# Patient Record
Sex: MALE | Race: BLACK OR AFRICAN AMERICAN | NOT HISPANIC OR LATINO | URBAN - METROPOLITAN AREA
[De-identification: names, ages, dates, MRNs, and addresses within clinical notes are randomized per-mention and may not be internally consistent; named-entity substitution may affect disease eponyms.]

---

## 2024-07-16 ENCOUNTER — OUTPATIENT (OUTPATIENT)
Dept: OUTPATIENT SERVICES | Facility: HOSPITAL | Age: 49
LOS: 1 days | End: 2024-07-16
Payer: MEDICAID

## 2024-07-16 VITALS
RESPIRATION RATE: 18 BRPM | TEMPERATURE: 98 F | HEART RATE: 59 BPM | SYSTOLIC BLOOD PRESSURE: 112 MMHG | WEIGHT: 209 LBS | DIASTOLIC BLOOD PRESSURE: 79 MMHG | OXYGEN SATURATION: 98 % | HEIGHT: 70 IN

## 2024-07-16 DIAGNOSIS — I10 ESSENTIAL (PRIMARY) HYPERTENSION: ICD-10-CM

## 2024-07-16 DIAGNOSIS — Z90.49 ACQUIRED ABSENCE OF OTHER SPECIFIED PARTS OF DIGESTIVE TRACT: Chronic | ICD-10-CM

## 2024-07-16 DIAGNOSIS — Z01.818 ENCOUNTER FOR OTHER PREPROCEDURAL EXAMINATION: ICD-10-CM

## 2024-07-16 DIAGNOSIS — L30.9 DERMATITIS, UNSPECIFIED: ICD-10-CM

## 2024-07-16 DIAGNOSIS — K40.90 UNILATERAL INGUINAL HERNIA, WITHOUT OBSTRUCTION OR GANGRENE, NOT SPECIFIED AS RECURRENT: ICD-10-CM

## 2024-07-16 LAB
ALBUMIN SERPL ELPH-MCNC: 3.6 G/DL — SIGNIFICANT CHANGE UP (ref 3.5–5)
ALP SERPL-CCNC: 66 U/L — SIGNIFICANT CHANGE UP (ref 40–120)
ALT FLD-CCNC: 31 U/L DA — SIGNIFICANT CHANGE UP (ref 10–60)
ANION GAP SERPL CALC-SCNC: 4 MMOL/L — LOW (ref 5–17)
APPEARANCE UR: CLEAR — SIGNIFICANT CHANGE UP
APTT BLD: 32 SEC — SIGNIFICANT CHANGE UP (ref 24.5–35.6)
AST SERPL-CCNC: 21 U/L — SIGNIFICANT CHANGE UP (ref 10–40)
BACTERIA # UR AUTO: ABNORMAL /HPF
BILIRUB SERPL-MCNC: 0.6 MG/DL — SIGNIFICANT CHANGE UP (ref 0.2–1.2)
BILIRUB UR-MCNC: NEGATIVE — SIGNIFICANT CHANGE UP
BUN SERPL-MCNC: 14 MG/DL — SIGNIFICANT CHANGE UP (ref 7–18)
CALCIUM SERPL-MCNC: 10.2 MG/DL — SIGNIFICANT CHANGE UP (ref 8.4–10.5)
CHLORIDE SERPL-SCNC: 110 MMOL/L — HIGH (ref 96–108)
CO2 SERPL-SCNC: 26 MMOL/L — SIGNIFICANT CHANGE UP (ref 22–31)
COLOR SPEC: YELLOW — SIGNIFICANT CHANGE UP
CREAT SERPL-MCNC: 1.09 MG/DL — SIGNIFICANT CHANGE UP (ref 0.5–1.3)
DIFF PNL FLD: NEGATIVE — SIGNIFICANT CHANGE UP
EGFR: 82 ML/MIN/1.73M2 — SIGNIFICANT CHANGE UP
GLUCOSE SERPL-MCNC: 74 MG/DL — SIGNIFICANT CHANGE UP (ref 70–99)
GLUCOSE UR QL: NEGATIVE MG/DL — SIGNIFICANT CHANGE UP
HCT VFR BLD CALC: 41.4 % — SIGNIFICANT CHANGE UP (ref 39–50)
HGB BLD-MCNC: 13.1 G/DL — SIGNIFICANT CHANGE UP (ref 13–17)
INR BLD: 0.89 RATIO — SIGNIFICANT CHANGE UP (ref 0.85–1.18)
KETONES UR-MCNC: ABNORMAL MG/DL
LEUKOCYTE ESTERASE UR-ACNC: NEGATIVE — SIGNIFICANT CHANGE UP
MCHC RBC-ENTMCNC: 25.8 PG — LOW (ref 27–34)
MCHC RBC-ENTMCNC: 31.6 GM/DL — LOW (ref 32–36)
MCV RBC AUTO: 81.7 FL — SIGNIFICANT CHANGE UP (ref 80–100)
NITRITE UR-MCNC: NEGATIVE — SIGNIFICANT CHANGE UP
NRBC # BLD: 0 /100 WBCS — SIGNIFICANT CHANGE UP (ref 0–0)
PH UR: 6.5 — SIGNIFICANT CHANGE UP (ref 5–8)
PLATELET # BLD AUTO: 170 K/UL — SIGNIFICANT CHANGE UP (ref 150–400)
POTASSIUM SERPL-MCNC: 4.2 MMOL/L — SIGNIFICANT CHANGE UP (ref 3.5–5.3)
POTASSIUM SERPL-SCNC: 4.2 MMOL/L — SIGNIFICANT CHANGE UP (ref 3.5–5.3)
PROT SERPL-MCNC: 7.2 G/DL — SIGNIFICANT CHANGE UP (ref 6–8.3)
PROT UR-MCNC: NEGATIVE MG/DL — SIGNIFICANT CHANGE UP
PROTHROM AB SERPL-ACNC: 10.2 SEC — SIGNIFICANT CHANGE UP (ref 9.5–13)
RBC # BLD: 5.07 M/UL — SIGNIFICANT CHANGE UP (ref 4.2–5.8)
RBC # FLD: 14.9 % — HIGH (ref 10.3–14.5)
RBC CASTS # UR COMP ASSIST: 1 /HPF — SIGNIFICANT CHANGE UP (ref 0–4)
SODIUM SERPL-SCNC: 140 MMOL/L — SIGNIFICANT CHANGE UP (ref 135–145)
SP GR SPEC: 1.02 — SIGNIFICANT CHANGE UP (ref 1–1.03)
UROBILINOGEN FLD QL: 0.2 MG/DL — SIGNIFICANT CHANGE UP (ref 0.2–1)
WBC # BLD: 4.19 K/UL — SIGNIFICANT CHANGE UP (ref 3.8–10.5)
WBC # FLD AUTO: 4.19 K/UL — SIGNIFICANT CHANGE UP (ref 3.8–10.5)
WBC UR QL: 0 /HPF — SIGNIFICANT CHANGE UP (ref 0–5)

## 2024-07-16 NOTE — H&P PST ADULT - PROBLEM SELECTOR PLAN 1
Pt is scheduled for right inguinal hernia repair with mesh on 7/31/24.  KALA stop bang score 3. Pt denies history of KALA, never did sleep study.  Labs and EKG done in PST-results will be faxed to PCP for medical optimization.  Preoperative instructions discussed with pt and copy of instructions given to pt. Instructed pt that he will need someone to escort him home after surgery, to notify security when he arrives in the lobby of the hospital that he is here for surgery, not to eat or drink anything after midnight the night before the surgery, to avoid NSAIDs such as Ibuprofen, Motrin, aleve, Advil, naproxen before surgery, to take Tylenol if needed for pain, to report if he has been exposed to any one with any contagious diseases including Covid-19 or if she is exhibiting any symptoms of COVID-19. Instructed about use of Chlorhexidine 4% soap for 3 days before surgery including the morning of surgery. Verbalized understanding of instructions given.

## 2024-07-16 NOTE — H&P PST ADULT - NSICDXPASTMEDICALHX_GEN_ALL_CORE_FT
PAST MEDICAL HISTORY:  HTN (hypertension)     Unilateral inguinal hernia without obstruction or gangrene

## 2024-07-16 NOTE — H&P PST ADULT - ASSESSMENT
This is a 49 year old male with PMH of Hypertension on Atenolol who presents with inguinal hernia without obstruction or gangrene. Pt is scheduled for right inguinal hernia repair with mesh on 7/31/24.  KALA stop bang score 3. Pt denies history of KALA, never did sleep study.

## 2024-07-16 NOTE — H&P PST ADULT - PROBLEM SELECTOR PLAN 2
Instructed to continue antihypertensive medication-Atenolol and take it with sips of water on day of surgery. Follow-up with PCP for management.

## 2024-07-16 NOTE — H&P PST ADULT - NSICDXFAMILYHX_GEN_ALL_CORE_FT
FAMILY HISTORY:  Mother  Still living? Yes, Estimated age: Age Unknown  Family history of CHF (congestive heart failure), Age at diagnosis: Age Unknown  Family history of COPD (chronic obstructive pulmonary disease), Age at diagnosis: Age Unknown  Family history of hypertension, Age at diagnosis: Age Unknown

## 2024-07-16 NOTE — H&P PST ADULT - HISTORY OF PRESENT ILLNESS
This is a 49 year old male with PMH of Hypertension on Atenolol who presents with c/o intermittent inguinal pain due to hernia. Pt reports worsening of pain with activity. Pt for right inguinal hernia repair with mesh on 7/31/24. This is a 49 year old male with PMH of Hypertension on Atenolol who presents with c/o intermittent right inguinal pain due to hernia. Pt is scheduled for right inguinal hernia repair with mesh on 7/31/24.

## 2024-07-17 LAB
HCV AB S/CO SERPL IA: 0.42 S/CO — SIGNIFICANT CHANGE UP (ref 0–0.99)
HCV AB SERPL-IMP: SIGNIFICANT CHANGE UP

## 2024-07-17 PROCEDURE — 93005 ELECTROCARDIOGRAM TRACING: CPT

## 2024-07-17 PROCEDURE — 85730 THROMBOPLASTIN TIME PARTIAL: CPT

## 2024-07-17 PROCEDURE — 86803 HEPATITIS C AB TEST: CPT

## 2024-07-17 PROCEDURE — 85610 PROTHROMBIN TIME: CPT

## 2024-07-17 PROCEDURE — 85027 COMPLETE CBC AUTOMATED: CPT

## 2024-07-17 PROCEDURE — 80053 COMPREHEN METABOLIC PANEL: CPT

## 2024-07-17 PROCEDURE — G0463: CPT

## 2024-07-17 PROCEDURE — 81001 URINALYSIS AUTO W/SCOPE: CPT

## 2024-07-17 PROCEDURE — 36415 COLL VENOUS BLD VENIPUNCTURE: CPT

## 2024-07-30 ENCOUNTER — TRANSCRIPTION ENCOUNTER (OUTPATIENT)
Age: 49
End: 2024-07-30

## 2024-07-31 ENCOUNTER — OUTPATIENT (OUTPATIENT)
Dept: OUTPATIENT SERVICES | Facility: HOSPITAL | Age: 49
LOS: 1 days | End: 2024-07-31
Payer: MEDICAID

## 2024-07-31 ENCOUNTER — TRANSCRIPTION ENCOUNTER (OUTPATIENT)
Age: 49
End: 2024-07-31

## 2024-07-31 VITALS
RESPIRATION RATE: 14 BRPM | DIASTOLIC BLOOD PRESSURE: 77 MMHG | SYSTOLIC BLOOD PRESSURE: 137 MMHG | OXYGEN SATURATION: 100 % | TEMPERATURE: 98 F

## 2024-07-31 VITALS
TEMPERATURE: 98 F | OXYGEN SATURATION: 99 % | HEART RATE: 55 BPM | DIASTOLIC BLOOD PRESSURE: 74 MMHG | WEIGHT: 209 LBS | HEIGHT: 70 IN | RESPIRATION RATE: 16 BRPM | SYSTOLIC BLOOD PRESSURE: 108 MMHG

## 2024-07-31 DIAGNOSIS — K40.90 UNILATERAL INGUINAL HERNIA, WITHOUT OBSTRUCTION OR GANGRENE, NOT SPECIFIED AS RECURRENT: ICD-10-CM

## 2024-07-31 DIAGNOSIS — Z90.49 ACQUIRED ABSENCE OF OTHER SPECIFIED PARTS OF DIGESTIVE TRACT: Chronic | ICD-10-CM

## 2024-07-31 PROCEDURE — C1781: CPT

## 2024-07-31 PROCEDURE — 49525 REPAIR ING HERNIA SLIDING: CPT | Mod: RT

## 2024-07-31 DEVICE — MESH HERNIA PERFIX PLUG LARGE 1.6 X 1.90": Type: IMPLANTABLE DEVICE | Site: RIGHT | Status: FUNCTIONAL

## 2024-07-31 RX ORDER — OXYCODONE AND ACETAMINOPHEN 10; 325 MG/1; MG/1
1 TABLET ORAL
Qty: 12 | Refills: 0
Start: 2024-07-31 | End: 2024-08-02

## 2024-07-31 RX ORDER — BACTERIOSTATIC SODIUM CHLORIDE 0.9 %
3 VIAL (ML) INJECTION EVERY 8 HOURS
Refills: 0 | Status: DISCONTINUED | OUTPATIENT
Start: 2024-07-31 | End: 2024-07-31

## 2024-07-31 RX ORDER — DOCUSATE SODIUM 50 MG/5ML
1 LIQUID ORAL
Qty: 14 | Refills: 0
Start: 2024-07-31 | End: 2024-08-06

## 2024-07-31 RX ORDER — BETAMETHASONE DIPROPIONATE 0.5 MG/G
1 CREAM TOPICAL
Refills: 0 | DISCHARGE

## 2024-07-31 RX ORDER — FENTANYL CITRATE 1200 UG/1
25 LOZENGE ORAL; TRANSMUCOSAL
Refills: 0 | Status: DISCONTINUED | OUTPATIENT
Start: 2024-07-31 | End: 2024-07-31

## 2024-07-31 RX ORDER — ATENOLOL 50 MG/1
1 TABLET ORAL
Refills: 0 | DISCHARGE

## 2024-07-31 RX ORDER — OXYCODONE HYDROCHLORIDE 30 MG/1
5 TABLET ORAL ONCE
Refills: 0 | Status: DISCONTINUED | OUTPATIENT
Start: 2024-07-31 | End: 2024-07-31

## 2024-07-31 RX ORDER — SENNOSIDES 8.6 MG/1
1 TABLET ORAL
Qty: 7 | Refills: 0
Start: 2024-07-31 | End: 2024-08-06

## 2024-07-31 RX ORDER — DEXTROSE MONOHYDRATE, SODIUM CHLORIDE, SODIUM LACTATE, CALCIUM CHLORIDE, MAGNESIUM CHLORIDE 1.5; 538; 448; 18.4; 5.08 G/100ML; MG/100ML; MG/100ML; MG/100ML; MG/100ML
1000 SOLUTION INTRAPERITONEAL
Refills: 0 | Status: DISCONTINUED | OUTPATIENT
Start: 2024-07-31 | End: 2024-07-31

## 2024-07-31 RX ORDER — FENTANYL CITRATE 1200 UG/1
50 LOZENGE ORAL; TRANSMUCOSAL
Refills: 0 | Status: DISCONTINUED | OUTPATIENT
Start: 2024-07-31 | End: 2024-07-31

## 2024-07-31 RX ADMIN — FENTANYL CITRATE 50 MICROGRAM(S): 1200 LOZENGE ORAL; TRANSMUCOSAL at 09:53

## 2024-07-31 RX ADMIN — OXYCODONE HYDROCHLORIDE 5 MILLIGRAM(S): 30 TABLET ORAL at 19:14

## 2024-07-31 RX ADMIN — OXYCODONE HYDROCHLORIDE 5 MILLIGRAM(S): 30 TABLET ORAL at 19:51

## 2024-07-31 RX ADMIN — FENTANYL CITRATE 50 MICROGRAM(S): 1200 LOZENGE ORAL; TRANSMUCOSAL at 10:08

## 2024-07-31 NOTE — ASU PREOP CHECKLIST - COMMENTS
pt. stated he "had tea with his medication this morning, no milk"----Dr. Holden (anesthsiologist) aware preop pt. stated he "had few sips of tea with his medication this morning, no milk"----Dr. Holden (anesthsiologist) aware preop

## 2024-07-31 NOTE — PROVIDER CONTACT NOTE (OTHER) - BACKGROUND
Pt returned to PACU from [facility] and felt numbness/difficulty walking on RLE after inguinal hernia repair due to local anesthetic to right groin. Pt in [facility] since 11am, still feels numb. Returned to pacu.
no pmh

## 2024-07-31 NOTE — ASU PATIENT PROFILE, ADULT - PAIN SCALE PREFERRED, PROFILE
Implemented All Universal Safety Interventions:  [place] to call system. Call bell, personal items and telephone within reach. Instruct patient to call for assistance. Room bathroom lighting operational. Non-slip footwear when patient is off stretcher. Physically safe environment: no spills, clutter or unnecessary equipment. Stretcher in lowest position, wheels locked, appropriate side rails in place. numerical [phone]

## 2024-07-31 NOTE — PROVIDER CONTACT NOTE (OTHER) - SITUATION
postop  right inguinal hernia repair since 11am felt RLE numbness and buckled in [facility] when he tried to walk. [facility] sent patient to PACU to be re-evaluated by surgery. last eval was at 1600.
pt r leg weakness/ partial numbness at right anterior thigh to groin

## 2024-07-31 NOTE — ASU DISCHARGE PLAN (ADULT/PEDIATRIC) - CARE PROVIDER_API CALL
Ashish Brantley  Colon/Rectal Surgery  [address] [place] [address]  Phone: [phone]  Fax: [phone]  Established Patient  Follow Up Time: 2 weeks

## 2024-07-31 NOTE — ASU DISCHARGE PLAN (ADULT/PEDIATRIC) - PAIN MANAGEMENT
Take over the counter pain medication/Prescriptions electronically submitted to pharmacy from doctor's office Prescription given to patient/guardian/Take over the counter pain medication/Prescriptions electronically submitted to pharmacy from doctor's office

## 2024-07-31 NOTE — PROVIDER CONTACT NOTE (OTHER) - RECOMMENDATIONS
MD Mohr sees pt has improved since she last assessed at 1600, but recommends pt should stay longer in pacu for more improvement in RLE strength.

## 2024-07-31 NOTE — PROVIDER CONTACT NOTE (OTHER) - ASSESSMENT
Pt is now able to walk without buckling, and pt states that numbness has receded to top of right thigh instead of whole right leg.- relayed information to MD Olivares
Pt now able to walk without buckling but still reports feeling numb at top right thigh to right hip, assisted by RN and MD Mohr.

## 2024-07-31 NOTE — PROVIDER CONTACT NOTE (OTHER) - RECOMMENDATIONS
MD Brantley stated pt can go home if patient is able to walk without issue. The numbness is no concern to the MD. MD Bradley confirmed that pt no longer needs re-evaluation from surgery. Ok to dc home.

## 2024-07-31 NOTE — ASU DISCHARGE PLAN (ADULT/PEDIATRIC) - ASU DC SPECIAL INSTRUCTIONSFT
Percocet 5/325mg 1 tab every 6 hours as needed for pain - Do NOT take Tylenol at the same time as it can lead to overdose and liver damage.    Once Percocet runs out, you may switch to:  Alternate Tylenol 650mg every 6 hours and Motrin 600mg every 6 hours for mild to moderate pain.    Start:  Senna 17.2mg every night at bedtime for 7 days to reduce straining on the toilet.  Colace 100mg two time per day for 7 days to reduce straining on the toilet.    Avoid lifting more than 10 lbs    You may start your regular diet.    You may remove the clear dressing and gauze on Friday afternoon, but leave the paper strips in place as they will fall off on their own. You may begin showering at this time. Allow warm soapy water to run over wound, and pat dry. Do not rub wound, and no soaking.    Follow-up in clinic in two weeks.

## 2024-07-31 NOTE — PROVIDER CONTACT NOTE (OTHER) - REASON
s/p right inguinal hernia repair pt still feels RLE numbness
pt from [facility] returning for leg numbness s/p right inguinal hernia repair

## 2024-07-31 NOTE — ASU PATIENT PROFILE, ADULT - FALL HARM RISK - UNIVERSAL INTERVENTIONS
Bed in lowest position, wheels locked, appropriate side rails in place/Call bell, personal items and telephone in reach/Instruct patient to call for assistance before getting out of bed or chair/Non-slip footwear when patient is out of bed/[place] to call system/Physically safe environment - no spills, clutter or unnecessary equipment/Purposeful Proactive Rounding/Room/bathroom lighting operational, light cord in reach

## (undated) DEVICE — GLV 7 PROTEXIS (WHITE)

## (undated) DEVICE — SUT POLYSORB 2-0 18" TIES UNDYED

## (undated) DEVICE — SUT SOFSILK 2-0 18" V-20 (POP-OFF)

## (undated) DEVICE — DRAPE LIGHT HANDLE COVER (BLUE)

## (undated) DEVICE — DRAPE 1/2 SHEET 40X57"

## (undated) DEVICE — DRAPE LAPAROTOMY W POUCHES

## (undated) DEVICE — PACK MINOR NO DRAPE

## (undated) DEVICE — SUT SURGIPRO 2-0 30" GS-22

## (undated) DEVICE — DRAIN PENROSE .5" X 18" LATEX

## (undated) DEVICE — SOL IRR POUR NS 0.9% 1500ML

## (undated) DEVICE — WARMING BLANKET UPPER ADULT

## (undated) DEVICE — VENODYNE/SCD SLEEVE CALF MEDIUM

## (undated) DEVICE — SUT POLYSORB 2-0 30" V-20 UNDYED

## (undated) DEVICE — SUT POLYSORB 3-0 30" V-20 UNDYED

## (undated) DEVICE — FOR-ESU VALLEYLAB T7E14999DX: Type: DURABLE MEDICAL EQUIPMENT

## (undated) DEVICE — ELCTR GROUNDING PAD ADULT COVIDIEN

## (undated) DEVICE — SPONGE DISSECTOR PEANUT

## (undated) DEVICE — DRSG MASTISOL

## (undated) DEVICE — DRSG CURITY GAUZE SPONGE 4 X 4" 12-PLY

## (undated) DEVICE — NDL HYPO SAFE 25G X 1.5" (ORANGE)

## (undated) DEVICE — SUT POLYSORB 4-0 27" P-12 UNDYED

## (undated) DEVICE — DRSG TEGADERM 4X4.75"